# Patient Record
Sex: FEMALE | Race: BLACK OR AFRICAN AMERICAN | Employment: FULL TIME | URBAN - METROPOLITAN AREA
[De-identification: names, ages, dates, MRNs, and addresses within clinical notes are randomized per-mention and may not be internally consistent; named-entity substitution may affect disease eponyms.]

---

## 2020-12-06 ENCOUNTER — APPOINTMENT (OUTPATIENT)
Dept: CT IMAGING | Age: 40
End: 2020-12-06
Payer: COMMERCIAL

## 2020-12-06 ENCOUNTER — HOSPITAL ENCOUNTER (EMERGENCY)
Age: 40
Discharge: HOME | End: 2020-12-06
Attending: EMERGENCY MEDICINE
Payer: COMMERCIAL

## 2020-12-06 ENCOUNTER — APPOINTMENT (OUTPATIENT)
Dept: GENERAL RADIOLOGY | Age: 40
End: 2020-12-06
Payer: COMMERCIAL

## 2020-12-06 VITALS
TEMPERATURE: 98.3 F | DIASTOLIC BLOOD PRESSURE: 81 MMHG | RESPIRATION RATE: 18 BRPM | OXYGEN SATURATION: 100 % | HEART RATE: 79 BPM | SYSTOLIC BLOOD PRESSURE: 116 MMHG

## 2020-12-06 LAB
ANION GAP SERPL CALCULATED.3IONS-SCNC: 13 MMOL/L (ref 3–16)
BASE EXCESS VENOUS: -1.4 MMOL/L (ref -2–3)
BASOPHILS ABSOLUTE: 0 K/UL (ref 0–0.2)
BASOPHILS RELATIVE PERCENT: 0.4 %
BUN BLDV-MCNC: 10 MG/DL (ref 7–20)
CALCIUM SERPL-MCNC: 9.9 MG/DL (ref 8.3–10.6)
CARBOXYHEMOGLOBIN: 2 % (ref 0–1.5)
CHLORIDE BLD-SCNC: 104 MMOL/L (ref 99–110)
CO2: 24 MMOL/L (ref 21–32)
CREAT SERPL-MCNC: 1 MG/DL (ref 0.6–1.1)
EOSINOPHILS ABSOLUTE: 0 K/UL (ref 0–0.6)
EOSINOPHILS RELATIVE PERCENT: 0.5 %
GFR AFRICAN AMERICAN: >60
GFR NON-AFRICAN AMERICAN: >60
GLUCOSE BLD-MCNC: 96 MG/DL (ref 70–99)
HCG QUALITATIVE: NEGATIVE
HCO3 VENOUS: 23.1 MMOL/L (ref 24–28)
HCT VFR BLD CALC: 40.5 % (ref 36–48)
HEMOGLOBIN, VEN, REDUCED: 24 %
HEMOGLOBIN: 12.8 G/DL (ref 12–16)
LYMPHOCYTES ABSOLUTE: 1.2 K/UL (ref 1–5.1)
LYMPHOCYTES RELATIVE PERCENT: 43.1 %
MCH RBC QN AUTO: 27.5 PG (ref 26–34)
MCHC RBC AUTO-ENTMCNC: 31.7 G/DL (ref 31–36)
MCV RBC AUTO: 86.9 FL (ref 80–100)
METHEMOGLOBIN VENOUS: 1 % (ref 0–1.5)
MONOCYTES ABSOLUTE: 0.4 K/UL (ref 0–1.3)
MONOCYTES RELATIVE PERCENT: 15.6 %
NEUTROPHILS ABSOLUTE: 1.1 K/UL (ref 1.7–7.7)
NEUTROPHILS RELATIVE PERCENT: 40.4 %
O2 SAT, VEN: 75 %
PCO2, VEN: 40.4 MMHG (ref 41–51)
PDW BLD-RTO: 15.1 % (ref 12.4–15.4)
PH VENOUS: 7.38 (ref 7.35–7.45)
PLATELET # BLD: 255 K/UL (ref 135–450)
PMV BLD AUTO: 8.7 FL (ref 5–10.5)
PO2, VEN: 46 MMHG (ref 25–40)
POTASSIUM REFLEX MAGNESIUM: 3.7 MMOL/L (ref 3.5–5.1)
PRO-BNP: <5 PG/ML (ref 0–124)
RBC # BLD: 4.66 M/UL (ref 4–5.2)
SODIUM BLD-SCNC: 141 MMOL/L (ref 136–145)
TCO2 CALC VENOUS: 24 MMOL/L
TROPONIN: <0.01 NG/ML
WBC # BLD: 2.8 K/UL (ref 4–11)

## 2020-12-06 PROCEDURE — 96374 THER/PROPH/DIAG INJ IV PUSH: CPT

## 2020-12-06 PROCEDURE — 6360000002 HC RX W HCPCS: Performed by: EMERGENCY MEDICINE

## 2020-12-06 PROCEDURE — 71045 X-RAY EXAM CHEST 1 VIEW: CPT

## 2020-12-06 PROCEDURE — 84484 ASSAY OF TROPONIN QUANT: CPT

## 2020-12-06 PROCEDURE — 2580000003 HC RX 258: Performed by: EMERGENCY MEDICINE

## 2020-12-06 PROCEDURE — 93005 ELECTROCARDIOGRAM TRACING: CPT | Performed by: EMERGENCY MEDICINE

## 2020-12-06 PROCEDURE — 83880 ASSAY OF NATRIURETIC PEPTIDE: CPT

## 2020-12-06 PROCEDURE — 85025 COMPLETE CBC W/AUTO DIFF WBC: CPT

## 2020-12-06 PROCEDURE — 99283 EMERGENCY DEPT VISIT LOW MDM: CPT

## 2020-12-06 PROCEDURE — 80048 BASIC METABOLIC PNL TOTAL CA: CPT

## 2020-12-06 PROCEDURE — 6360000004 HC RX CONTRAST MEDICATION: Performed by: EMERGENCY MEDICINE

## 2020-12-06 PROCEDURE — 71260 CT THORAX DX C+: CPT

## 2020-12-06 PROCEDURE — 84703 CHORIONIC GONADOTROPIN ASSAY: CPT

## 2020-12-06 PROCEDURE — 82803 BLOOD GASES ANY COMBINATION: CPT

## 2020-12-06 RX ORDER — PAROXETINE HYDROCHLORIDE 20 MG/1
20 TABLET, FILM COATED ORAL EVERY MORNING
COMMUNITY

## 2020-12-06 RX ORDER — KETOROLAC TROMETHAMINE 30 MG/ML
15 INJECTION, SOLUTION INTRAMUSCULAR; INTRAVENOUS ONCE
Status: COMPLETED | OUTPATIENT
Start: 2020-12-06 | End: 2020-12-06

## 2020-12-06 RX ORDER — HYDROCHLOROTHIAZIDE 12.5 MG/1
12.5 CAPSULE, GELATIN COATED ORAL DAILY
COMMUNITY

## 2020-12-06 RX ORDER — OMEPRAZOLE 20 MG/1
20 CAPSULE, DELAYED RELEASE ORAL DAILY
COMMUNITY

## 2020-12-06 RX ORDER — SODIUM CHLORIDE, SODIUM LACTATE, POTASSIUM CHLORIDE, AND CALCIUM CHLORIDE .6; .31; .03; .02 G/100ML; G/100ML; G/100ML; G/100ML
1000 INJECTION, SOLUTION INTRAVENOUS ONCE
Status: COMPLETED | OUTPATIENT
Start: 2020-12-06 | End: 2020-12-06

## 2020-12-06 RX ADMIN — IOPAMIDOL 80 ML: 755 INJECTION, SOLUTION INTRAVENOUS at 16:32

## 2020-12-06 RX ADMIN — SODIUM CHLORIDE, POTASSIUM CHLORIDE, SODIUM LACTATE AND CALCIUM CHLORIDE 1000 ML: 600; 310; 30; 20 INJECTION, SOLUTION INTRAVENOUS at 15:39

## 2020-12-06 RX ADMIN — KETOROLAC TROMETHAMINE 15 MG: 30 INJECTION, SOLUTION INTRAMUSCULAR at 15:39

## 2020-12-06 ASSESSMENT — PAIN SCALES - GENERAL
PAINLEVEL_OUTOF10: 1
PAINLEVEL_OUTOF10: 4

## 2020-12-06 NOTE — ED NOTES
Discharge instructions reviewed with Pt. Pt verbalizes understanding at this time. medications reviewed with pt at this time. VS as noted. Pt condition stable at this time. No concerns voiced.       Pepe Lagos RN  12/06/20 [id number]

## 2020-12-06 NOTE — ED PROVIDER NOTES
[address]          ATTENDING PHYSICIAN NOTE       Date of evaluation: 2020    Chief Complaint     Shortness of Breath and Dizziness      History of Present Illness     Olimpia Huerta is a 46 y.o. female with a PMH as described below who presents to the ED today with a chief complaint of: Fatigue, lightheadedness, dizziness, chest pain, shortness of breath. The patient states that she has had symptoms for 4 days. In the last 24 hours she has reportedly lost her sense of smell and taste. She denies any significant cough or fevers but feels very fatigued and tired. She additionally notes a 1 day history of left-sided chest discomfort which wraps around the left chest wall. She notes that on day 1 and 2 of symptoms she had fever and chills but those have improved. She denies any known sick contacts. She denies any abdominal pain, vomiting, diarrhea, dysuria, vaginal bleeding or discharge. The patientstates that there were no other associated signs and symptoms or modifying factors. Patient rates pain as 2/10. Review of Systems     As described above in the HPI otherwise all the systems reviewed and negative as reported by the patient. Past Medical, Surgical, Family, and Social History     She has a past medical history of Acid reflux, Ectopic pregnancy, and GERD (gastroesophageal reflux disease). She has a past surgical history that includes  section. Her family history is not on file. She reports that she has been smoking cigarettes. She has been smoking about 0.10 packs per day. She does not have any smokeless tobacco history on file. She reports current alcohol use. She reports that she does not use drugs.     Medications     Previous Medications    BUSPIRONE (BUSPAR) 7.5 MG TABLET    Take 7.5 mg by mouth daily    HYDROCHLOROTHIAZIDE (MICROZIDE) 12.5 MG CAPSULE    Take 12.5 mg by mouth daily    IBUPROFEN (ADVIL;MOTRIN) 800 MG TABLET Take 1 tablet by mouth three times daily    NONFORMULARY        OMEPRAZOLE (PRILOSEC) 20 MG DELAYED RELEASE CAPSULE    Take 20 mg by mouth daily    PAROXETINE (PAXIL) 20 MG TABLET    Take 20 mg by mouth every morning       Allergies     She has No Known Allergies. Physical Exam     INITIAL VITALS: BP: (!) 132/100, Temp: 98.3 °F (36.8 °C), Pulse: 104, Resp: (!) 31, SpO2: 100 %   Physical Exam  Vitals signs and nursing note reviewed. Constitutional:       Appearance: She is well-developed and normal weight. HENT:      Head: Normocephalic and atraumatic. Eyes:      Extraocular Movements: Extraocular movements intact. Pupils: Pupils are equal, round, and reactive to light. Cardiovascular:      Rate and Rhythm: Regular rhythm. Tachycardia present. Pulmonary:      Effort: Pulmonary effort is normal.      Breath sounds: Normal breath sounds. Chest:      Comments: Mild reproducible left chest wall tenderness  Musculoskeletal: Normal range of motion. Right lower leg: No edema. Left lower leg: No edema. Skin:     General: Skin is warm. Capillary Refill: Capillary refill takes less than 2 seconds. Neurological:      General: No focal deficit present. Mental Status: She is alert. Psychiatric:         Mood and Affect: Mood normal.         DiagnosticResults     EKG   Indication: Chest pain: Ventricular rate 94, LA interval 174, QRS 78, QTc 437, axis 47 degrees. Normal sinus rhythm. No acute ST-T wave elevations or depressions concerning for acute ischemia or infarct. No significant change compared to prior EKG performed in March 2016. RADIOLOGY:  CT CHEST PULMONARY EMBOLISM W CONTRAST   Final Result      1. No evidence of any pulmonary thromboembolus, aneurysm or dissection. 2. Scattered pulmonary nodules the largest is 7 mm in the right upper lobe and somewhat branched or lobular in appearance, irregular which needs further workup.  Additional nodules noted right middle lobe measuring 5 mm, right lower lobe measuring 3 x 4    mm and in the left upper lobe pulmonary nodule measuring 5 to 6 mm near the major fissure. Three-month follow-up recommended to evaluate the irregular pulmonary nodules particularly the right upper lobe nodular lobular area      XR CHEST PORTABLE   Final Result   1. No findings for acute cardiopulmonary disease.           LABS:   Results for orders placed or performed during the hospital encounter of 12/06/20   CBC auto differential   Result Value Ref Range    WBC 2.8 (L) 4.0 - 11.0 K/uL    RBC 4.66 4.00 - 5.20 M/uL    Hemoglobin 12.8 12.0 - 16.0 g/dL    Hematocrit 40.5 36.0 - 48.0 %    MCV 86.9 80.0 - 100.0 fL    MCH 27.5 26.0 - 34.0 pg    MCHC 31.7 31.0 - 36.0 g/dL    RDW 15.1 12.4 - 15.4 %    Platelets 474 140 - 771 K/uL    MPV 8.7 5.0 - 10.5 fL    Neutrophils % 40.4 %    Lymphocytes % 43.1 %    Monocytes % 15.6 %    Eosinophils % 0.5 %    Basophils % 0.4 %    Neutrophils Absolute 1.1 (L) 1.7 - 7.7 K/uL    Lymphocytes Absolute 1.2 1.0 - 5.1 K/uL    Monocytes Absolute 0.4 0.0 - 1.3 K/uL    Eosinophils Absolute 0.0 0.0 - 0.6 K/uL    Basophils Absolute 0.0 0.0 - 0.2 K/uL   Basic Metabolic Panel w/ Reflex to MG   Result Value Ref Range    Sodium 141 136 - 145 mmol/L    Potassium reflex Magnesium 3.7 3.5 - 5.1 mmol/L    Chloride 104 99 - 110 mmol/L    CO2 24 21 - 32 mmol/L    Anion Gap 13 3 - 16    Glucose 96 70 - 99 mg/dL    BUN 10 7 - 20 mg/dL    CREATININE 1.0 0.6 - 1.1 mg/dL    GFR Non-African American >60 >60    GFR African American >60 >60    Calcium 9.9 8.3 - 10.6 mg/dL   Blood gas, venous (Lab)   Result Value Ref Range    pH, Vinay 7.376 7.350 - 7.450    pCO2, Vinay 40.4 (L) 41.0 - 51.0 mmHg    pO2, Vinay 46.0 (H) 25.0 - 40.0 mmHg    HCO3, Venous 23.1 (L) 24.0 - 28.0 mmol/L    Base Excess, Vinay -1.4 -2.0 - 3.0 mmol/L    O2 Sat, Vinay 75 Not established %    Carboxyhemoglobin 2.0 (H) 0.0 - 1.5 %    MetHgb, Vinay 1.0 0.0 - 1.5 %    TC02 (Calc), Vniay 24 mmol/L Hemoglobin, Vinay, Reduced 24.00 %   Troponin   Result Value Ref Range    Troponin <0.01 <0.01 ng/mL   Brain Natriuretic Peptide   Result Value Ref Range    Pro-BNP <5 0 - 124 pg/mL   HCG Qualitative, Serum   Result Value Ref Range    hCG Qual Negative Detects HCG level >10 MIU/mL       ED BEDSIDE ULTRASOUND:      RECENT VITALS:  BP: 116/81, Temp: 98.3 °F (36.8 °C),Pulse: 79, Resp: 18, SpO2: 100 %     Procedures         ED Course     Nursing Notes, Past Medical Hx, Past Surgical Hx, Social Hx, Allergies, and Family Hx werereviewed. The patient was given thefollowing medications:  Orders Placed This Encounter   Medications    lactated ringers bolus    ketorolac (TORADOL) injection 15 mg    iopamidol (ISOVUE-370) 76 % injection 80 mL       CONSULTS:  None    MEDICAL DECISION MAKING / ASSESSMENT / Tejal Oglesby is a 46 y.o. female on examination the patient was initially tachycardic complaining of left-sided chest discomfort, loss of taste and smell, and fatigue. She was recently tested for coronavirus today. She does not have the results yet. Clinically there is strong suspicion for coronavirus however given her chest discomfort, a more thorough work-up was performed. EKG was nonischemic. Laboratory studies show mild leukopenia suggestive of possible viral process. Given that she was initially tachycardic I could not rule her out for pulmonary embolism based on the pulmonary embolism rule out criteria. I do not feel that D-dimer was sufficient given the suspicion for coronavirus. CTPA was performed which did not show any evidence of pulmonary embolism but did show some pulmonary nodules. These were communicated to the patient and the need for follow-up as recommended by radiology. Ultimately, I believe that she is safe for discharge home with a presumptive diagnosis of suspected coronavirus infection.   She will be discharged home in good condition with home care instructions regarding suspected covid. .        Clinical Impression     1.  Suspected COVID-19 virus infection        Disposition     PATIENT REFERRED TO:  AMANDA Olsno - CNP  [address] [facility],[address]  [phone]            DISCHARGE MEDICATIONS:  New Prescriptions    No medications on file       DISPOSITION Decision To Discharge 12/06/2020 05:13:45 PM         Awais Gonzales MD  12/06/20 1714

## 2020-12-07 ENCOUNTER — CARE COORDINATION (OUTPATIENT)
Dept: CARE COORDINATION | Age: 40
End: 2020-12-07

## 2020-12-07 LAB
EKG ATRIAL RATE: 94 BPM
EKG DIAGNOSIS: NORMAL
EKG P AXIS: 58 DEGREES
EKG P-R INTERVAL: 172 MS
EKG Q-T INTERVAL: 350 MS
EKG QRS DURATION: 78 MS
EKG QTC CALCULATION (BAZETT): 437 MS
EKG R AXIS: 47 DEGREES
EKG T AXIS: 27 DEGREES
EKG VENTRICULAR RATE: 94 BPM

## 2020-12-07 NOTE — CARE COORDINATION
ACM attempted to  contact Lazarus Kempf to follow up on her ED visit from yesterday dx: Suspected COVID-19 virus infection. Unable to contact x 3. Covid negative   No further outreach at this time.

## 2021-09-28 ENCOUNTER — HOSPITAL ENCOUNTER (EMERGENCY)
Age: 41
Discharge: HOME | End: 2021-09-28
Attending: EMERGENCY MEDICINE
Payer: COMMERCIAL

## 2021-09-28 ENCOUNTER — APPOINTMENT (OUTPATIENT)
Dept: GENERAL RADIOLOGY | Age: 41
End: 2021-09-28
Payer: COMMERCIAL

## 2021-09-28 VITALS
SYSTOLIC BLOOD PRESSURE: 127 MMHG | TEMPERATURE: 98.8 F | BODY MASS INDEX: 42.55 KG/M2 | HEIGHT: 66 IN | HEART RATE: 65 BPM | WEIGHT: 264.78 LBS | DIASTOLIC BLOOD PRESSURE: 92 MMHG | RESPIRATION RATE: 14 BRPM | OXYGEN SATURATION: 99 %

## 2021-09-28 DIAGNOSIS — M77.51 RIGHT ANKLE TENDONITIS: Primary | ICD-10-CM

## 2021-09-28 DIAGNOSIS — M79.604 RIGHT LEG PAIN: ICD-10-CM

## 2021-09-28 LAB — D DIMER: 230 NG/ML DDU (ref 0–229)

## 2021-09-28 PROCEDURE — 73610 X-RAY EXAM OF ANKLE: CPT

## 2021-09-28 PROCEDURE — 6370000000 HC RX 637 (ALT 250 FOR IP): Performed by: EMERGENCY MEDICINE

## 2021-09-28 PROCEDURE — 99284 EMERGENCY DEPT VISIT MOD MDM: CPT

## 2021-09-28 PROCEDURE — 85379 FIBRIN DEGRADATION QUANT: CPT

## 2021-09-28 RX ORDER — IBUPROFEN 800 MG/1
800 TABLET ORAL ONCE
Status: COMPLETED | OUTPATIENT
Start: 2021-09-28 | End: 2021-09-28

## 2021-09-28 RX ORDER — IBUPROFEN 800 MG/1
800 TABLET ORAL EVERY 8 HOURS PRN
Qty: 20 TABLET | Refills: 0 | Status: SHIPPED | OUTPATIENT
Start: 2021-09-28 | End: 2021-10-08

## 2021-09-28 RX ADMIN — IBUPROFEN 800 MG: 800 TABLET, FILM COATED ORAL at 11:57

## 2021-09-28 ASSESSMENT — PAIN DESCRIPTION - LOCATION: LOCATION: ANKLE

## 2021-09-28 ASSESSMENT — PAIN DESCRIPTION - PAIN TYPE: TYPE: ACUTE PAIN

## 2021-09-28 ASSESSMENT — PAIN DESCRIPTION - DESCRIPTORS: DESCRIPTORS: ACHING

## 2021-09-28 ASSESSMENT — PAIN DESCRIPTION - ORIENTATION: ORIENTATION: RIGHT

## 2021-09-28 ASSESSMENT — PAIN SCALES - GENERAL
PAINLEVEL_OUTOF10: 5
PAINLEVEL_OUTOF10: 5

## 2021-09-28 NOTE — ED PROVIDER NOTES
[facility] EMERGENCY DEPT VISIT      Patient Identification  Dara Chin is a 45 y.o. female. Chief Complaint   Ankle Injury (right)      History of Present Illness: This is a  45 y.o. female who presents ambulatory  to the ED with complaints of right ankle pain that radiates up right calf to knee. No definite injury immediately prior to pain although may have stepped wrong while wearing clogs. Hurts posterolateral ankle/heel and radiates up calf. Hurts more to walk. No calf swelling or redness. No chest pain or trouble breathing. No OCPs. No travel or immobilization. No h/o DVT or PE. No recent covid. Past Medical History:   Diagnosis Date    Acid reflux     Ectopic pregnancy     GERD (gastroesophageal reflux disease)        Past Surgical History:   Procedure Laterality Date     SECTION         No current facility-administered medications for this encounter.     Current Outpatient Medications:     ibuprofen (IBU) 800 MG tablet, Take 1 tablet by mouth every 8 hours as needed for Pain, Disp: 20 tablet, Rfl: 0    hydroCHLOROthiazide (MICROZIDE) 12.5 MG capsule, Take 12.5 mg by mouth daily, Disp: , Rfl:     omeprazole (PRILOSEC) 20 MG delayed release capsule, Take 20 mg by mouth daily, Disp: , Rfl:     PARoxetine (PAXIL) 20 MG tablet, Take 20 mg by mouth every morning, Disp: , Rfl:     busPIRone (BUSPAR) 7.5 MG tablet, Take 7.5 mg by mouth daily, Disp: , Rfl:     NONFORMULARY, , Disp: , Rfl:     ibuprofen (ADVIL;MOTRIN) 800 MG tablet, Take 1 tablet by mouth three times daily, Disp: 20 tablet, Rfl: 3    No Known Allergies    Social History     Socioeconomic History    Marital status: Single     Spouse name: Not on file    Number of children: Not on file    Years of education: Not on file    Highest education level: Not on file   Occupational History    Not on file   Tobacco Use    Smoking status: Light Tobacco Smoker     Packs/day: 0.10     Types: Cigarettes    Smokeless tobacco: Never Used    Tobacco comment: \"quit 30 days ago\" per pt   Substance and Sexual Activity    Alcohol use: Yes     Comment: occ    Drug use: No    Sexual activity: Yes     Partners: Male   Other Topics Concern    Not on file   Social History Narrative    Not on file     Social Determinants of Health     Financial Resource Strain:     Difficulty of Paying Living Expenses:    Food Insecurity:     Worried About Running Out of Food in the Last Year:     [address] in the Last Year:    Transportation Needs:     Lack of Transportation (Medical):  Lack of Transportation (Non-Medical):    Physical Activity:     Days of Exercise per Week:     Minutes of Exercise per Session:    Stress:     Feeling of Stress :    Social Connections:     Frequency of Communication with Friends and Family:     Frequency of Social Gatherings with Friends and Family:     Attends Rastafari Services:     Active Member of Clubs or Organizations:     Attends Club or Organization Meetings:     Marital Status:    Intimate Partner Violence:     Fear of Current or Ex-Partner:     Emotionally Abused:     Physically Abused:     Sexually Abused:        Nursing Notes Reviewed      ROS:  General: no fever  ENT: no sinus congestion, no sore throat  RESP: no cough, no shortness of breath  CARDIAC: no chest pain  GI: no abdominal pain, no vomiting, no diarrhea  Musculoskeletal: + arthralgia, no myalgia, no back pain,  no joint swelling  NEURO: no headache, no numbness, no weakness, no dizziness  DERM: no rash, no erythema, no ecchymosis, no wounds      PHYSICAL EXAM:  GENERAL APPEARANCE: Konstantin Mccormick is in no acute respiratory distress. Awake and alert.   VITAL SIGNS:   ED Triage Vitals [09/28/21 1111]   Enc Vitals Group      BP (!) 127/92      Pulse 65      Resp 14      Temp 98.8 °F (37.1 °C)      Temp Source Oral      SpO2 99 %      Weight 264 lb 12.5 oz (120.1 kg)      Height 5' 6\" (1.676 m)      Head Circumference       Peak Flow Pain Score       Pain Loc       Pain Edu? Excl. in [address]? HEAD: Normocephalic, atraumatic. EYES:  Extraocular muscles are intact. Conjunctivas are pink. Negative scleral icterus. ENT:  Mucous membranes are moist.   NECK: Nontender and supple. CHEST: Clear to auscultation bilaterally. No rales, rhonchi, or wheezing. HEART:  Regular rate and rhythm. No murmurs. Strong and equal pulses in the upper and lower extremities. ABDOMEN: Soft,  nondistended, positive bowel sounds. abdomen is nontender. MUSCULOSKELETAL:  Active range of motion of the upper and lower extremities. No edema. Negative harry sign. Strong pedal pulses. No calf or ankle/foot swelling. Right ankle is tender just lateral to achilles. . no achilles defect. No medial or lateral malleolus tenderness. minimal calf tenderness. NEUROLOGICAL: Awake, alert and oriented x 3. Power intact in the upper and lower extremities. DERMATOLOGIC: No petechiae, rashes, or ecchymoses. ED COURSE AND MEDICAL DECISION MAKING:      Radiology:  All plain films have been evaluated by myself. They may have been overread by radiologist as noted in chart. Other radiologic studies (i.e. CT, MRI, ultrasounds, etc ) have been interpreted by radiologist.     XR ANKLE RIGHT (MIN 3 VIEWS)   Final Result      3 views demonstrate minimal ankle arthritis. No fracture or effusion. VL DUP LOWER EXTREMITY VENOUS RIGHT    (Results Pending)       Labs:  Results for orders placed or performed during the hospital encounter of 09/28/21   D-Dimer, Quantitative   Result Value Ref Range    D-Dimer, Quant 230 (H) 0 - 229 ng/mL DDU       Treatment in the department:  Patient received   Medications   ibuprofen (ADVIL;MOTRIN) tablet 800 mg (800 mg Oral Given 9/28/21 1157)     Patient placed in orthotic boot. Medical decision making:  Patient with right ankle pain radiating to calf. Negative homans. No risk factors for DVT. No leg swelling, redness or warmth.  ddimer upper end of normal. Will arrange for outpt duplex. Suspect more likely this is tendonitis. No fever or redness to suggest septic joint. I estimate there is LOW risk for FRACTURE, DISLOCATION, COMPARTMENT SYNDROME, DEEP VENOUS THROMBOSIS, SEPTIC ARTHRITIS, OSTEOMYELITIS, TENDON OR NEUROVASCULAR INJURY, thus I consider the discharge disposition reasonable. Hyun Blackburn and I have discussed the diagnosis and risks, and we agree with discharging home to follow-up with their primary doctor or the referral orthopedist. We also discussed returning to the Emergency Department immediately if new or worsening symptoms occur. Clinical Impression:  1. Right ankle tendonitis    2. Right leg pain        Dispo:  Patient will be discharged  at this time. Patient was informed of this decision and agrees with plan. I have discussed lab and xray findings with patient and they understand. Questions were answered to the best of my ability. Discharge vitals:  Blood pressure (!) 127/92, pulse 65, temperature 98.8 °F (37.1 °C), temperature source Oral, resp. rate 14, height 5' 6\" (1.676 m), weight 264 lb 12.5 oz (120.1 kg), SpO2 99 %, not currently breastfeeding. Prescriptions given:   Discharge Medication List as of 9/28/2021  2:35 PM      START taking these medications    Details   !! ibuprofen (IBU) 800 MG tablet Take 1 tablet by mouth every 8 hours as needed for Pain, Disp-20 tablet, R-0Print       !! - Potential duplicate medications found. Please discuss with provider. This chart was created using dragon voice recognition software.         Chelsey Ureña MD  09/29/21 1861

## 2021-09-28 NOTE — LETTER
[facility]  [place] [id number]  Phone: [phone]  Fax: [phone]               September 28, 2021    Patient: Juan Jose Nelson   YOB: 1980   Date of Visit: 9/28/2021       To Whom It May Concern:    Luis Finnegan was seen and treated in our emergency department on 9/28/2021. She may return to work 9/2/2021.       Sincerely,               Signature:__________________________________

## 2021-09-28 NOTE — ED NOTES
Patient to ed with complaints of a right ankle injury which occurred 2 weeks ago patient has continued pain and swelling.      Alana Kayser, RN  09/28/21 [id number]

## 2021-09-29 ENCOUNTER — HOSPITAL ENCOUNTER (OUTPATIENT)
Dept: VASCULAR LAB | Age: 41
Discharge: HOME | End: 2021-09-29
Payer: COMMERCIAL

## 2021-09-29 DIAGNOSIS — M79.604 RIGHT LEG PAIN: ICD-10-CM

## 2021-09-29 PROCEDURE — 93971 EXTREMITY STUDY: CPT
